# Patient Record
Sex: FEMALE | NOT HISPANIC OR LATINO
[De-identification: names, ages, dates, MRNs, and addresses within clinical notes are randomized per-mention and may not be internally consistent; named-entity substitution may affect disease eponyms.]

---

## 2019-11-27 ENCOUNTER — APPOINTMENT (OUTPATIENT)
Dept: INTERNAL MEDICINE | Facility: CLINIC | Age: 39
End: 2019-11-27
Payer: COMMERCIAL

## 2019-11-27 ENCOUNTER — NON-APPOINTMENT (OUTPATIENT)
Age: 39
End: 2019-11-27

## 2019-11-27 VITALS
WEIGHT: 119 LBS | HEIGHT: 62.5 IN | SYSTOLIC BLOOD PRESSURE: 112 MMHG | DIASTOLIC BLOOD PRESSURE: 60 MMHG | BODY MASS INDEX: 21.35 KG/M2

## 2019-11-27 DIAGNOSIS — Z82.49 FAMILY HISTORY OF ISCHEMIC HEART DISEASE AND OTHER DISEASES OF THE CIRCULATORY SYSTEM: ICD-10-CM

## 2019-11-27 DIAGNOSIS — Z78.9 OTHER SPECIFIED HEALTH STATUS: ICD-10-CM

## 2019-11-27 DIAGNOSIS — Z87.2 PERSONAL HISTORY OF DISEASES OF THE SKIN AND SUBCUTANEOUS TISSUE: ICD-10-CM

## 2019-11-27 DIAGNOSIS — Z23 ENCOUNTER FOR IMMUNIZATION: ICD-10-CM

## 2019-11-27 DIAGNOSIS — Z86.79 PERSONAL HISTORY OF OTHER DISEASES OF THE CIRCULATORY SYSTEM: ICD-10-CM

## 2019-11-27 DIAGNOSIS — Z83.438 FAMILY HISTORY OF OTHER DISORDER OF LIPOPROTEIN METABOLISM AND OTHER LIPIDEMIA: ICD-10-CM

## 2019-11-27 PROCEDURE — G0008: CPT

## 2019-11-27 PROCEDURE — 90686 IIV4 VACC NO PRSV 0.5 ML IM: CPT

## 2019-11-27 PROCEDURE — 99386 PREV VISIT NEW AGE 40-64: CPT | Mod: 25

## 2019-11-27 PROCEDURE — 36415 COLL VENOUS BLD VENIPUNCTURE: CPT

## 2019-11-27 PROCEDURE — 93000 ELECTROCARDIOGRAM COMPLETE: CPT

## 2019-11-27 NOTE — HISTORY OF PRESENT ILLNESS
[FreeTextEntry1] : Initial visit\par \par Annual exam [de-identified] : 39-year-old female, mother of 2, works full time presents for annual exam. Feels well, exercises fairly regularly, denies chest pain shortness of breath palpitations dizziness.\par \par Up to date with screenings

## 2019-11-27 NOTE — HEALTH RISK ASSESSMENT
[Good] : ~his/her~  mood as  good [Yes] : Yes [Monthly or less (1 pt)] : Monthly or less (1 point) [1 or 2 (0 pts)] : 1 or 2 (0 points) [Never (0 pts)] : Never (0 points) [No falls in past year] : Patient reported no falls in the past year [0] : 2) Feeling down, depressed, or hopeless: Not at all (0) [Patient reported mammogram was normal] : Patient reported mammogram was normal [Patient reported PAP Smear was normal] : Patient reported PAP Smear was normal [HIV test declined] : HIV test declined [Hepatitis C test declined] : Hepatitis C test declined [] : No [[id number]] : 0 [MammogramDate] : 01/19 [PapSmearDate] : 01/19

## 2019-11-27 NOTE — PLAN
[FreeTextEntry1] : A 39-year-old female presents for annual exam, feels well. Denies chest pain shortness of breath palpitations dizziness. Up-to-date with screenings\par \par Encouraged to be more consistent with exercise.\par \par Follows up with gastroenterologist for pancreatic cyst.\par \par Call one week for lab results

## 2019-11-27 NOTE — PHYSICAL EXAM
[Normal Female:] : bladder was normal on palpation [Normal] : normal gait, coordination grossly intact, no focal deficits [de-identified] : Deferred GYN; Denies pain, lumps and discharge [FreeTextEntry1] : Deferred GI/GYN [de-identified] : No lymphadenopathy [de-identified] : Denies excessive thirst, urination, fatigue [de-identified] : No rash or skin lesion [de-identified] : Alert and Oriented x3.  Appropriate mood and affect

## 2019-11-28 LAB
ALBUMIN SERPL ELPH-MCNC: 4.9 G/DL
ALP BLD-CCNC: 56 U/L
ALT SERPL-CCNC: 16 U/L
ANION GAP SERPL CALC-SCNC: 14 MMOL/L
APPEARANCE: CLEAR
AST SERPL-CCNC: 18 U/L
BASOPHILS # BLD AUTO: 0.04 K/UL
BASOPHILS NFR BLD AUTO: 0.8 %
BILIRUB SERPL-MCNC: 0.3 MG/DL
BILIRUBIN URINE: NEGATIVE
BLOOD URINE: NEGATIVE
BUN SERPL-MCNC: 17 MG/DL
CALCIUM SERPL-MCNC: 9.9 MG/DL
CHLORIDE SERPL-SCNC: 103 MMOL/L
CHOLEST SERPL-MCNC: 216 MG/DL
CHOLEST/HDLC SERPL: 2.7 RATIO
CO2 SERPL-SCNC: 25 MMOL/L
COLOR: YELLOW
CREAT SERPL-MCNC: 0.74 MG/DL
EOSINOPHIL # BLD AUTO: 0.06 K/UL
EOSINOPHIL NFR BLD AUTO: 1.2 %
FOLATE SERPL-MCNC: 17 NG/ML
GLUCOSE QUALITATIVE U: NEGATIVE
GLUCOSE SERPL-MCNC: 80 MG/DL
HCT VFR BLD CALC: 41.4 %
HDLC SERPL-MCNC: 79 MG/DL
HGB BLD-MCNC: 12.8 G/DL
IMM GRANULOCYTES NFR BLD AUTO: 0.2 %
KETONES URINE: NEGATIVE
LDLC SERPL CALC-MCNC: 124 MG/DL
LEUKOCYTE ESTERASE URINE: NEGATIVE
LYMPHOCYTES # BLD AUTO: 2.07 K/UL
LYMPHOCYTES NFR BLD AUTO: 41.7 %
MAN DIFF?: NORMAL
MCHC RBC-ENTMCNC: 28.1 PG
MCHC RBC-ENTMCNC: 30.9 GM/DL
MCV RBC AUTO: 91 FL
MONOCYTES # BLD AUTO: 0.51 K/UL
MONOCYTES NFR BLD AUTO: 10.3 %
NEUTROPHILS # BLD AUTO: 2.27 K/UL
NEUTROPHILS NFR BLD AUTO: 45.8 %
NITRITE URINE: NEGATIVE
PH URINE: 7.5
PLATELET # BLD AUTO: 301 K/UL
POTASSIUM SERPL-SCNC: 4.8 MMOL/L
PROT SERPL-MCNC: 7.8 G/DL
PROTEIN URINE: NORMAL
RBC # BLD: 4.55 M/UL
RBC # FLD: 13.6 %
SODIUM SERPL-SCNC: 142 MMOL/L
SPECIFIC GRAVITY URINE: 1.02
T4 FREE SERPL-MCNC: 1.3 NG/DL
TRIGL SERPL-MCNC: 67 MG/DL
TSH SERPL-ACNC: 2.3 UIU/ML
UROBILINOGEN URINE: NORMAL
VIT B12 SERPL-MCNC: 601 PG/ML
WBC # FLD AUTO: 4.96 K/UL

## 2019-12-20 ENCOUNTER — APPOINTMENT (OUTPATIENT)
Dept: INTERNAL MEDICINE | Facility: CLINIC | Age: 39
End: 2019-12-20
Payer: COMMERCIAL

## 2019-12-20 VITALS
SYSTOLIC BLOOD PRESSURE: 120 MMHG | HEIGHT: 62.5 IN | BODY MASS INDEX: 20.99 KG/M2 | WEIGHT: 117 LBS | DIASTOLIC BLOOD PRESSURE: 70 MMHG

## 2019-12-20 VITALS — TEMPERATURE: 98.1 F

## 2019-12-20 DIAGNOSIS — R14.0 ABDOMINAL DISTENSION (GASEOUS): ICD-10-CM

## 2019-12-20 PROCEDURE — 99213 OFFICE O/P EST LOW 20 MIN: CPT

## 2019-12-20 RX ORDER — VALACYCLOVIR 1 G/1
1 TABLET, FILM COATED ORAL
Qty: 30 | Refills: 0 | Status: DISCONTINUED | COMMUNITY
Start: 2019-12-04 | End: 2019-12-20

## 2019-12-20 NOTE — PLAN
[FreeTextEntry1] : 39-year-old female, very anxious about travel, presents with complaint of on and off right upper quadrant abdominal pain for about a week, for the past couple of days occurring after lunch but not any other meal. Abdomen is soft nontender with hyperactive bowel sounds. She currently takes Prilosec daily advised bland diet Gas-X and if symptoms increase to go to the emergency room

## 2019-12-20 NOTE — REVIEW OF SYSTEMS
[Abdominal Pain] : abdominal pain [Negative] : Heme/Lymph [FreeTextEntry7] : right upper quadrant abdominal pain as noted history of present illness

## 2019-12-20 NOTE — HISTORY OF PRESENT ILLNESS
[FreeTextEntry8] : A 39-year-old female presents with complaint of right upper quadrant abdominal pain on an off for the past week. For the past 2 days it seems to be more after eating. Takes Prilosec daily for reflux. Diet has not been the same due to the holidays. Denies fevers chills, constipation/diarrhea.\par \par Leaving for [place] 12/26 and very worried about being sick there.

## 2019-12-20 NOTE — PHYSICAL EXAM
[No Acute Distress] : no acute distress [Well Nourished] : well nourished [Well Developed] : well developed [Well-Appearing] : well-appearing [Normal Sclera/Conjunctiva] : normal sclera/conjunctiva [PERRL] : pupils equal round and reactive to light [EOMI] : extraocular movements intact [Normal Outer Ear/Nose] : the outer ears and nose were normal in appearance [Normal Oropharynx] : the oropharynx was normal [No JVD] : no jugular venous distention [No Lymphadenopathy] : no lymphadenopathy [Supple] : supple [Thyroid Normal, No Nodules] : the thyroid was normal and there were no nodules present [No Respiratory Distress] : no respiratory distress  [No Accessory Muscle Use] : no accessory muscle use [Clear to Auscultation] : lungs were clear to auscultation bilaterally [Normal Rate] : normal rate  [Regular Rhythm] : with a regular rhythm [Normal S1, S2] : normal S1 and S2 [No Murmur] : no murmur heard [No Carotid Bruits] : no carotid bruits [No Abdominal Bruit] : a ~M bruit was not heard ~T in the abdomen [No Varicosities] : no varicosities [Pedal Pulses Present] : the pedal pulses are present [No Edema] : there was no peripheral edema [No Palpable Aorta] : no palpable aorta [No Extremity Clubbing/Cyanosis] : no extremity clubbing/cyanosis [Soft] : abdomen soft [Non Tender] : non-tender [Non-distended] : non-distended [No Masses] : no abdominal mass palpated [No HSM] : no HSM [Normal Posterior Cervical Nodes] : no posterior cervical lymphadenopathy [Normal Anterior Cervical Nodes] : no anterior cervical lymphadenopathy [No CVA Tenderness] : no CVA  tenderness [No Spinal Tenderness] : no spinal tenderness [No Joint Swelling] : no joint swelling [Grossly Normal Strength/Tone] : grossly normal strength/tone [No Rash] : no rash [Coordination Grossly Intact] : coordination grossly intact [No Focal Deficits] : no focal deficits [Normal Gait] : normal gait [Deep Tendon Reflexes (DTR)] : deep tendon reflexes were 2+ and symmetric [Normal Affect] : the affect was normal [Normal Insight/Judgement] : insight and judgment were intact [de-identified] : abdomen completely nontenderwith hyperactive bowel sounds

## 2021-03-02 ENCOUNTER — APPOINTMENT (OUTPATIENT)
Dept: FAMILY MEDICINE | Facility: CLINIC | Age: 41
End: 2021-03-02

## 2021-03-19 ENCOUNTER — APPOINTMENT (OUTPATIENT)
Dept: INTERNAL MEDICINE | Facility: CLINIC | Age: 41
End: 2021-03-19
Payer: COMMERCIAL

## 2021-03-19 ENCOUNTER — LABORATORY RESULT (OUTPATIENT)
Age: 41
End: 2021-03-19

## 2021-03-19 ENCOUNTER — NON-APPOINTMENT (OUTPATIENT)
Age: 41
End: 2021-03-19

## 2021-03-19 VITALS
WEIGHT: 121 LBS | DIASTOLIC BLOOD PRESSURE: 74 MMHG | BODY MASS INDEX: 21.71 KG/M2 | HEIGHT: 62.5 IN | SYSTOLIC BLOOD PRESSURE: 116 MMHG

## 2021-03-19 DIAGNOSIS — B00.9 HERPESVIRAL INFECTION, UNSPECIFIED: ICD-10-CM

## 2021-03-19 DIAGNOSIS — Z20.822 CONTACT WITH AND (SUSPECTED) EXPOSURE TO COVID-19: ICD-10-CM

## 2021-03-19 PROCEDURE — 99396 PREV VISIT EST AGE 40-64: CPT | Mod: 25

## 2021-03-19 PROCEDURE — 36415 COLL VENOUS BLD VENIPUNCTURE: CPT

## 2021-03-19 PROCEDURE — 99072 ADDL SUPL MATRL&STAF TM PHE: CPT

## 2021-03-19 NOTE — PLAN
[FreeTextEntry1] : 40-year-old female as noted presents for annual exam complaining of recurrent herpes infection. Discussed at length, and given prescription for Valtrex and also advised to use Aleve for up to 3 days in arousal to help with discomfort. Patient knows to take it with food\par \par Reviewed diet and stressed the importance of continuing regular aerobic exercise\par \par Call next week to review labs

## 2021-03-19 NOTE — HISTORY OF PRESENT ILLNESS
[FreeTextEntry1] : Annual exam [de-identified] : 40-year-old female 2 sons 7 and 10, she and her  are working from home presents for annual exam. Only complaint is that approximately 2 months ago she developed pain on the right side of her face around her cheek below her eyes and developed a rash went to urgent care 2 different times first time diagnosed with staph and given an antibiotic which didn't help second time diagnosed with shingles and given Valtrex. She saw ophthalmology who said her eye was fine but the it was not shingles. The rash has recurred a couple of times since then, she went to see dermatologist Dr. Blanca  who diagnosed her with herpes simplex. Even when she doesn't have the rash he has a headache and pain in the right side of her face, no weakness. She is concerned about this continuing to recur. Right now she has mild pain on the right side of her face mild headache and a very minimal red blotch on her right cheek. Dr. Blanca has her mixing bacitracin and a topical steroid to use to decrease redness and inflammation as well as prevent infection.\par \par No other complaints, exercising regularly without difficulty, up to date with screenings

## 2021-03-19 NOTE — PHYSICAL EXAM
[Normal Female:] : bladder was normal on palpation [Normal] : normal gait, coordination grossly intact, no focal deficits [de-identified] : Deferred GYN; Denies pain, lumps and discharge [FreeTextEntry1] : Deferred GI/GYN [de-identified] : No lymphadenopathy [de-identified] : Denies excessive thirst, urination, fatigue [de-identified] : Slightly erythmatic dime-sized macular lesion right upper cheek

## 2021-03-19 NOTE — HEALTH RISK ASSESSMENT
[Very Good] : ~his/her~  mood as very good [Yes] : Yes [Monthly or less (1 pt)] : Monthly or less (1 point) [1 or 2 (0 pts)] : 1 or 2 (0 points) [Never (0 pts)] : Never (0 points) [No falls in past year] : Patient reported no falls in the past year [0] : 2) Feeling down, depressed, or hopeless: Not at all (0) [Patient reported mammogram was normal] : Patient reported mammogram was normal [Patient reported PAP Smear was normal] : Patient reported PAP Smear was normal [Patient declined bone density test] : Patient declined bone density test [Patient declined colonoscopy] : Patient declined colonoscopy [HIV test declined] : HIV test declined [Hepatitis C test declined] : Hepatitis C test declined [] : No [[id number]] : 0 [MammogramDate] : 01/20 [PapSmearDate] : 03/20

## 2021-03-20 LAB
ALBUMIN SERPL ELPH-MCNC: 5.1 G/DL
ALP BLD-CCNC: 72 U/L
ALT SERPL-CCNC: 14 U/L
ANION GAP SERPL CALC-SCNC: 16 MMOL/L
APPEARANCE: CLEAR
AST SERPL-CCNC: 18 U/L
BASOPHILS # BLD AUTO: 0.05 K/UL
BASOPHILS NFR BLD AUTO: 0.7 %
BILIRUB SERPL-MCNC: 0.4 MG/DL
BILIRUBIN URINE: NEGATIVE
BLOOD URINE: NEGATIVE
BUN SERPL-MCNC: 9 MG/DL
CALCIUM SERPL-MCNC: 9.8 MG/DL
CHLORIDE SERPL-SCNC: 99 MMOL/L
CHOLEST SERPL-MCNC: 244 MG/DL
CO2 SERPL-SCNC: 23 MMOL/L
COLOR: COLORLESS
COVID-19 NUCLEOCAPSID  GAM ANTIBODY INTERPRETATION: NEGATIVE
CREAT SERPL-MCNC: 0.62 MG/DL
EOSINOPHIL # BLD AUTO: 0.03 K/UL
EOSINOPHIL NFR BLD AUTO: 0.4 %
FOLATE SERPL-MCNC: 15 NG/ML
GLUCOSE QUALITATIVE U: NEGATIVE
GLUCOSE SERPL-MCNC: 97 MG/DL
HCT VFR BLD CALC: 41 %
HDLC SERPL-MCNC: 80 MG/DL
HGB BLD-MCNC: 13.3 G/DL
IMM GRANULOCYTES NFR BLD AUTO: 0.4 %
KETONES URINE: NEGATIVE
LDLC SERPL CALC-MCNC: 152 MG/DL
LEUKOCYTE ESTERASE URINE: NEGATIVE
LYMPHOCYTES # BLD AUTO: 2.53 K/UL
LYMPHOCYTES NFR BLD AUTO: 35.2 %
MAN DIFF?: NORMAL
MCHC RBC-ENTMCNC: 29.3 PG
MCHC RBC-ENTMCNC: 32.4 GM/DL
MCV RBC AUTO: 90.3 FL
MONOCYTES # BLD AUTO: 0.58 K/UL
MONOCYTES NFR BLD AUTO: 8.1 %
NEUTROPHILS # BLD AUTO: 3.97 K/UL
NEUTROPHILS NFR BLD AUTO: 55.2 %
NITRITE URINE: NEGATIVE
NONHDLC SERPL-MCNC: 164 MG/DL
PH URINE: 6
PLATELET # BLD AUTO: 332 K/UL
POTASSIUM SERPL-SCNC: 3.8 MMOL/L
PROT SERPL-MCNC: 8.3 G/DL
PROTEIN URINE: NEGATIVE
RBC # BLD: 4.54 M/UL
RBC # FLD: 14.5 %
SARS-COV-2 AB SERPL QL IA: 0.07 INDEX
SODIUM SERPL-SCNC: 138 MMOL/L
SPECIFIC GRAVITY URINE: 1
T4 FREE SERPL-MCNC: 1.5 NG/DL
TRIGL SERPL-MCNC: 60 MG/DL
TSH SERPL-ACNC: 2.27 UIU/ML
UROBILINOGEN URINE: NORMAL
VIT B12 SERPL-MCNC: 705 PG/ML
WBC # FLD AUTO: 7.19 K/UL

## 2021-04-26 ENCOUNTER — APPOINTMENT (OUTPATIENT)
Dept: INTERNAL MEDICINE | Facility: CLINIC | Age: 41
End: 2021-04-26
Payer: COMMERCIAL

## 2021-04-26 ENCOUNTER — LABORATORY RESULT (OUTPATIENT)
Age: 41
End: 2021-04-26

## 2021-04-26 VITALS
DIASTOLIC BLOOD PRESSURE: 70 MMHG | BODY MASS INDEX: 20.99 KG/M2 | WEIGHT: 117 LBS | TEMPERATURE: 99.1 F | SYSTOLIC BLOOD PRESSURE: 104 MMHG | HEIGHT: 62.5 IN

## 2021-04-26 DIAGNOSIS — M62.838 OTHER MUSCLE SPASM: ICD-10-CM

## 2021-04-26 PROCEDURE — 36415 COLL VENOUS BLD VENIPUNCTURE: CPT

## 2021-04-26 PROCEDURE — 99072 ADDL SUPL MATRL&STAF TM PHE: CPT

## 2021-04-26 PROCEDURE — 99213 OFFICE O/P EST LOW 20 MIN: CPT | Mod: 25

## 2021-04-26 NOTE — HISTORY OF PRESENT ILLNESS
[FreeTextEntry8] : 40-year-old female presents complaining of headache, mostly right-sided, for the past 4 days. It started about an hour before getting her second covid vaccine and has been on and off since. Denies change in vision, weakness. States she has some slight nausea occasionally but no vomiting. She doesn't usually get headaches and she's never been diagnosed with migraine. She's also complaining about neck pain which she's had in the past but it's been worse the last few days, she occasionally has tingling sensation/pain radiating down right arm which does not last for very long, she denies any weakness.\par She's been working from home and it's been very stressful, a lot of pressure at work, difficulty with childcare, she feels her anxiety is much worse than usual lately.

## 2021-04-26 NOTE — PLAN
[FreeTextEntry1] : 40-year-old female is noted presents complaining of a headache neck pain anxiety. Also concerned she may have Lyme, Lyme titer done. Her symptoms otherwise are more consistent with anxiety and tension headache. Advised Excedrin 2 tabs q.6 h. p.r.n., stay hydrated, given prescription for physical therapy for her neck, advised moist heat, also strongly advised good ergonomics when working from home. If symptoms increase or persist return to office otherwise: 2 days for lab results and followup

## 2021-04-26 NOTE — PHYSICAL EXAM
[No Acute Distress] : no acute distress [Well Nourished] : well nourished [Well Developed] : well developed [Well-Appearing] : well-appearing [Normal Sclera/Conjunctiva] : normal sclera/conjunctiva [PERRL] : pupils equal round and reactive to light [EOMI] : extraocular movements intact [Normal Outer Ear/Nose] : the outer ears and nose were normal in appearance [Normal Oropharynx] : the oropharynx was normal [No JVD] : no jugular venous distention [No Lymphadenopathy] : no lymphadenopathy [Supple] : supple [Thyroid Normal, No Nodules] : the thyroid was normal and there were no nodules present [No Respiratory Distress] : no respiratory distress  [No Accessory Muscle Use] : no accessory muscle use [Clear to Auscultation] : lungs were clear to auscultation bilaterally [Normal Rate] : normal rate  [Regular Rhythm] : with a regular rhythm [Normal S1, S2] : normal S1 and S2 [No Murmur] : no murmur heard [No Carotid Bruits] : no carotid bruits [No Abdominal Bruit] : a ~M bruit was not heard ~T in the abdomen [No Varicosities] : no varicosities [Pedal Pulses Present] : the pedal pulses are present [No Edema] : there was no peripheral edema [No Palpable Aorta] : no palpable aorta [No Extremity Clubbing/Cyanosis] : no extremity clubbing/cyanosis [Soft] : abdomen soft [Non Tender] : non-tender [Non-distended] : non-distended [No Masses] : no abdominal mass palpated [No HSM] : no HSM [Normal Bowel Sounds] : normal bowel sounds [Normal Posterior Cervical Nodes] : no posterior cervical lymphadenopathy [Normal Anterior Cervical Nodes] : no anterior cervical lymphadenopathy [No CVA Tenderness] : no CVA  tenderness [No Spinal Tenderness] : no spinal tenderness [No Joint Swelling] : no joint swelling [Grossly Normal Strength/Tone] : grossly normal strength/tone [No Rash] : no rash [Coordination Grossly Intact] : coordination grossly intact [No Focal Deficits] : no focal deficits [Normal Gait] : normal gait [Deep Tendon Reflexes (DTR)] : deep tendon reflexes were 2+ and symmetric [Speech Grossly Normal] : speech grossly normal [Alert and Oriented x3] : oriented to person, place, and time [Normal Insight/Judgement] : insight and judgment were intact [de-identified] : anxious

## 2021-04-26 NOTE — REVIEW OF SYSTEMS
[Headache] : headache [Dizziness] : no dizziness [Negative] : Heme/Lymph [FreeTextEntry4] : neck pain [de-identified] : mostly right-sided and frontal

## 2021-04-27 ENCOUNTER — APPOINTMENT (OUTPATIENT)
Dept: OBGYN | Facility: CLINIC | Age: 41
End: 2021-04-27
Payer: COMMERCIAL

## 2021-04-27 VITALS
SYSTOLIC BLOOD PRESSURE: 126 MMHG | HEIGHT: 62.5 IN | DIASTOLIC BLOOD PRESSURE: 70 MMHG | WEIGHT: 117 LBS | BODY MASS INDEX: 20.99 KG/M2

## 2021-04-27 DIAGNOSIS — R92.2 INCONCLUSIVE MAMMOGRAM: ICD-10-CM

## 2021-04-27 DIAGNOSIS — Z12.31 ENCOUNTER FOR SCREENING MAMMOGRAM FOR MALIGNANT NEOPLASM OF BREAST: ICD-10-CM

## 2021-04-27 LAB — B BURGDOR IGG+IGM SER QL IB: NORMAL

## 2021-04-27 PROCEDURE — 99386 PREV VISIT NEW AGE 40-64: CPT

## 2021-04-27 PROCEDURE — 99072 ADDL SUPL MATRL&STAF TM PHE: CPT

## 2021-04-27 NOTE — HISTORY OF PRESENT ILLNESS
[TextBox_4] : 40 year old  for annual exam. No complaints today. Regular monthly periods; her period just started yesterday. Declines Pap today, wishes to come back when she isn't menstruating. No history of cervical dysplasia. Her  is s/p vasectomy. Due for mammogram; has history of fibroadenoma, PASH, and benign cysts that have all been biopsied in the past. Works as a  for an [employer] agency. Her kids are 7 and 10 years old.

## 2021-05-05 ENCOUNTER — TRANSCRIPTION ENCOUNTER (OUTPATIENT)
Age: 41
End: 2021-05-05

## 2021-05-17 ENCOUNTER — RESULT REVIEW (OUTPATIENT)
Age: 41
End: 2021-05-17

## 2021-05-20 ENCOUNTER — APPOINTMENT (OUTPATIENT)
Dept: NEUROLOGY | Facility: CLINIC | Age: 41
End: 2021-05-20

## 2021-09-13 RX ORDER — VALACYCLOVIR 1 G/1
1 TABLET, FILM COATED ORAL
Qty: 60 | Refills: 0 | Status: ACTIVE | COMMUNITY
Start: 1900-01-01 | End: 1900-01-01

## 2021-10-26 ENCOUNTER — APPOINTMENT (OUTPATIENT)
Dept: INTERNAL MEDICINE | Facility: CLINIC | Age: 41
End: 2021-10-26
Payer: COMMERCIAL

## 2021-10-26 DIAGNOSIS — Z00.00 ENCOUNTER FOR GENERAL ADULT MEDICAL EXAMINATION W/OUT ABNORMAL FINDINGS: ICD-10-CM

## 2021-10-26 PROCEDURE — 36415 COLL VENOUS BLD VENIPUNCTURE: CPT

## 2021-10-27 LAB
CHOLEST SERPL-MCNC: 262 MG/DL
HDLC SERPL-MCNC: 68 MG/DL
LDLC SERPL CALC-MCNC: 184 MG/DL
NONHDLC SERPL-MCNC: 194 MG/DL
TRIGL SERPL-MCNC: 52 MG/DL

## 2021-10-28 ENCOUNTER — APPOINTMENT (OUTPATIENT)
Dept: INTERNAL MEDICINE | Facility: CLINIC | Age: 41
End: 2021-10-28

## 2021-11-04 ENCOUNTER — APPOINTMENT (OUTPATIENT)
Dept: INTERNAL MEDICINE | Facility: CLINIC | Age: 41
End: 2021-11-04
Payer: COMMERCIAL

## 2021-11-04 VITALS
WEIGHT: 104 LBS | HEIGHT: 62.5 IN | DIASTOLIC BLOOD PRESSURE: 60 MMHG | BODY MASS INDEX: 18.66 KG/M2 | SYSTOLIC BLOOD PRESSURE: 120 MMHG

## 2021-11-04 DIAGNOSIS — E78.5 HYPERLIPIDEMIA, UNSPECIFIED: ICD-10-CM

## 2021-11-04 DIAGNOSIS — F41.8 OTHER SPECIFIED ANXIETY DISORDERS: ICD-10-CM

## 2021-11-04 PROCEDURE — 36415 COLL VENOUS BLD VENIPUNCTURE: CPT

## 2021-11-04 PROCEDURE — 99213 OFFICE O/P EST LOW 20 MIN: CPT | Mod: 25

## 2021-11-04 RX ORDER — VITAMIN E (DL,TOCOPHERYL ACET) 180 MG
CAPSULE ORAL
Refills: 0 | Status: DISCONTINUED | COMMUNITY
End: 2021-11-04

## 2021-11-04 RX ORDER — VALACYCLOVIR 1 G/1
1 TABLET, FILM COATED ORAL
Qty: 60 | Refills: 2 | Status: DISCONTINUED | COMMUNITY
Start: 2021-03-19 | End: 2021-11-04

## 2021-11-04 RX ORDER — OMEPRAZOLE MAGNESIUM 20 MG/1
20 TABLET, DELAYED RELEASE ORAL
Refills: 0 | Status: DISCONTINUED | COMMUNITY
End: 2021-11-04

## 2021-11-04 NOTE — HISTORY OF PRESENT ILLNESS
[FreeTextEntry1] : Followup hyperlipidemia [de-identified] : 41-year-old very anxious female who has been working on diet and exercise for at least 6 months, she has lost weight, borderline too much weight, in order to control her cholesterol. She is very disappointed that her LDL has gone up from 6 months ago.Both her parents have high cholesterol but no early heart disease, both parents are in mid 70s

## 2021-11-04 NOTE — PLAN
[FreeTextEntry1] : 41-year-old female, very anxious, upset about hyperlipidemia. Discussed her diet, lifestyle in detail. Referred to Dilma Quarles nutritionist, repeat lipid panel today and sent to [facility]. Followup 2-3 days

## 2021-11-17 ENCOUNTER — APPOINTMENT (OUTPATIENT)
Dept: GASTROENTEROLOGY | Facility: CLINIC | Age: 41
End: 2021-11-17
Payer: COMMERCIAL

## 2021-11-17 VITALS
SYSTOLIC BLOOD PRESSURE: 126 MMHG | WEIGHT: 105 LBS | DIASTOLIC BLOOD PRESSURE: 78 MMHG | BODY MASS INDEX: 18.84 KG/M2 | HEIGHT: 62.5 IN

## 2021-11-17 DIAGNOSIS — K29.70 GASTRITIS, UNSPECIFIED, W/OUT BLEEDING: ICD-10-CM

## 2021-11-17 PROCEDURE — 99204 OFFICE O/P NEW MOD 45 MIN: CPT

## 2021-11-17 RX ORDER — CYCLOBENZAPRINE HYDROCHLORIDE 10 MG/1
10 TABLET, FILM COATED ORAL
Qty: 20 | Refills: 0 | Status: DISCONTINUED | COMMUNITY
Start: 2021-04-26 | End: 2021-11-17

## 2021-11-18 NOTE — ASSESSMENT
[FreeTextEntry1] : Pancreatic cyst- due for surveillance MRI\par -patient will bring prior imaging on CD\par -MRI ordered\par \par Gastritis- likely recent exacerbation due to NSAIDS\par -resume PPI. \par -obtained prior records\par -taper off of PPI in 2-3 months\par -resume PPI if requiring regular NSAIDS\par \par \par Right Pelvic pain- likely MSK strain due to recent increase in exercise. \par -gyn eval scheduled\par -in setting of recent weight loss, also recommend colonoscopy, \par Risks (including but not limited to sedation risks, infection, bleeding, perforation, possibility of missed lesions), benefits and alternatives to procedure, including not doing the procedure, were discussed with patient. Patient understood and agreed to proceed with colonoscopy. \par Colonoscopy preparation instructions reviewed with patient.\par 2 Dulcolax two days prior to procedure + Split MiraLAX/Dulcolax preparation starting day prior to procedure\par \par Weight loss- somewhat intentional , although patient now reports she is below her baseline weight. \par -recommend colonoscopy.\par \par anal lesion- refer to CRS, r/o AIN\par \par

## 2021-11-18 NOTE — HISTORY OF PRESENT ILLNESS
[FreeTextEntry1] : 41 year old F PMH anxiety, hld, h/o pancreatic cyst , h/o gastritis, h/o anal fissure, presents for evaluation of multiple GI complaints. \par \par previously followed with Dr. Bustamante and Dr. Cullen\par \par She has acid reflux/ gastritis, with epigastric pain 30 min after eating. she has been off/on ppi x 4 years. She has tried to stop many times, longest she can stay off of it is 4 months. \par tried to stop ~ 1 month ago\par then has flare of her symptoms 1 month later then has to take it twice a day  and tapers to 20 mg daily. \par she restarted 2 days ago. \par \par she has had 3 prior EGD, last one 3 years ago at [facility]. she has small HH. \par \par RLQ pain off/on for the past decade. she has had prior imaging. it was gone for 3 years. Recently returned.  1 week ago felt like right lower abdomen was raised, then had pain there. She was seen at urgent care, no labs or imaging performed. \par last abd imaging was 2 years ago. \par \par she is seeing gyn in 2 weeks. \par \par No prior colonoscopy\par \par 1 month ago she felt a "tickle" in her rectum. felt small hard white bump in her anus.\par \par since 04/2021, she lost 20 lbs, through diet and exercise. She had  gained weight previously. She now feels she is underweight. \par she feels that 109-110 lbs is her normal weight\par she is now maintaining her weight at ~ 105 lbs\par \par cardio 5d per week, strength 5-6 d per week . \par  \par Advil - few times per week , 3 per day or 2 Aleve. \par \par Patient denies dysphagia/odynophagia, change in bowel habits, diarrhea, constipation, brbpr, melena. Good appetite. Patient has daily BM. \par \par fam hx- negative for cancer

## 2021-11-18 NOTE — REASON FOR VISIT
[Consultation] : a consultation visit [FreeTextEntry1] : Patient seen at the request of LUZMA Gonzalez for the evaluation of GI complaints

## 2021-11-18 NOTE — PHYSICAL EXAM
[General Appearance - Alert] : alert [General Appearance - In No Acute Distress] : in no acute distress [Sclera] : the sclera and conjunctiva were normal [Outer Ear] : the ears and nose were normal in appearance [Neck Appearance] : the appearance of the neck was normal [] : no respiratory distress [Apical Impulse] : the apical impulse was normal [Abdomen Soft] : soft [Abdomen Tenderness] : non-tender [Abnormal Walk] : normal gait [Skin Color & Pigmentation] : normal skin color and pigmentation [Oriented To Time, Place, And Person] : oriented to person, place, and time [FreeTextEntry1] : healed posterior anal fissure, small white punctate anal lesions, nontender. normal LACI, no stool in vault. Chaperoned by Trisha Aragon MA

## 2021-11-23 ENCOUNTER — APPOINTMENT (OUTPATIENT)
Dept: SURGERY | Facility: CLINIC | Age: 41
End: 2021-11-23
Payer: COMMERCIAL

## 2021-11-23 VITALS
HEART RATE: 100 BPM | OXYGEN SATURATION: 100 % | HEIGHT: 62.5 IN | WEIGHT: 105 LBS | BODY MASS INDEX: 18.84 KG/M2 | RESPIRATION RATE: 19 BRPM | DIASTOLIC BLOOD PRESSURE: 82 MMHG | SYSTOLIC BLOOD PRESSURE: 138 MMHG

## 2021-11-23 DIAGNOSIS — R19.8 OTHER SPECIFIED SYMPTOMS AND SIGNS INVOLVING THE DIGESTIVE SYSTEM AND ABDOMEN: ICD-10-CM

## 2021-11-23 PROCEDURE — 46600 DIAGNOSTIC ANOSCOPY SPX: CPT

## 2021-11-23 PROCEDURE — 99202 OFFICE O/P NEW SF 15 MIN: CPT | Mod: 25

## 2021-12-02 ENCOUNTER — APPOINTMENT (OUTPATIENT)
Dept: OBGYN | Facility: CLINIC | Age: 41
End: 2021-12-02
Payer: COMMERCIAL

## 2021-12-02 VITALS
BODY MASS INDEX: 18.84 KG/M2 | SYSTOLIC BLOOD PRESSURE: 130 MMHG | HEIGHT: 62.5 IN | DIASTOLIC BLOOD PRESSURE: 70 MMHG | WEIGHT: 105 LBS

## 2021-12-02 DIAGNOSIS — R10.31 RIGHT LOWER QUADRANT PAIN: ICD-10-CM

## 2021-12-02 DIAGNOSIS — G89.29 RIGHT LOWER QUADRANT PAIN: ICD-10-CM

## 2021-12-02 DIAGNOSIS — Z12.4 ENCOUNTER FOR SCREENING FOR MALIGNANT NEOPLASM OF CERVIX: ICD-10-CM

## 2021-12-02 PROCEDURE — 99215 OFFICE O/P EST HI 40 MIN: CPT

## 2021-12-06 LAB
CYTOLOGY CVX/VAG DOC THIN PREP: NORMAL
HPV HIGH+LOW RISK DNA PNL CVX: NOT DETECTED

## 2021-12-07 ENCOUNTER — RESULT REVIEW (OUTPATIENT)
Age: 41
End: 2021-12-07

## 2021-12-08 ENCOUNTER — RESULT REVIEW (OUTPATIENT)
Age: 41
End: 2021-12-08

## 2021-12-13 ENCOUNTER — APPOINTMENT (OUTPATIENT)
Dept: GASTROENTEROLOGY | Facility: CLINIC | Age: 41
End: 2021-12-13
Payer: COMMERCIAL

## 2021-12-13 VITALS
DIASTOLIC BLOOD PRESSURE: 80 MMHG | SYSTOLIC BLOOD PRESSURE: 120 MMHG | HEIGHT: 62.5 IN | WEIGHT: 103 LBS | HEART RATE: 104 BPM | OXYGEN SATURATION: 99 % | BODY MASS INDEX: 18.48 KG/M2 | TEMPERATURE: 96.6 F

## 2021-12-13 DIAGNOSIS — R10.13 EPIGASTRIC PAIN: ICD-10-CM

## 2021-12-13 DIAGNOSIS — N83.292 OTHER OVARIAN CYST, LEFT SIDE: ICD-10-CM

## 2021-12-13 DIAGNOSIS — R10.2 PELVIC AND PERINEAL PAIN: ICD-10-CM

## 2021-12-13 DIAGNOSIS — K86.2 CYST OF PANCREAS: ICD-10-CM

## 2021-12-13 PROCEDURE — 99214 OFFICE O/P EST MOD 30 MIN: CPT

## 2021-12-13 RX ORDER — HUMAN PAPILLOMAVIRUS 9-VALENT VACCINE, RECOMBINANT 30; 40; 60; 40; 20; 20; 20; 20; 20 UG/.5ML; UG/.5ML; UG/.5ML; UG/.5ML; UG/.5ML; UG/.5ML; UG/.5ML; UG/.5ML; UG/.5ML
INJECTION, SUSPENSION INTRAMUSCULAR
Qty: 1 | Refills: 2 | Status: DISCONTINUED | COMMUNITY
Start: 2021-12-02 | End: 2021-12-13

## 2021-12-14 PROBLEM — K86.2 PANCREATIC CYST: Status: ACTIVE | Noted: 2019-11-27

## 2021-12-14 PROBLEM — R10.2 PELVIC PAIN: Status: ACTIVE | Noted: 2021-12-02

## 2021-12-14 LAB
ALBUMIN SERPL ELPH-MCNC: 4.8 G/DL
ALP BLD-CCNC: 66 U/L
ALT SERPL-CCNC: 13 U/L
ANION GAP SERPL CALC-SCNC: 12 MMOL/L
AST SERPL-CCNC: 16 U/L
BASOPHILS # BLD AUTO: 0.04 K/UL
BASOPHILS NFR BLD AUTO: 0.6 %
BILIRUB SERPL-MCNC: 0.2 MG/DL
BUN SERPL-MCNC: 11 MG/DL
CALCIUM SERPL-MCNC: 9.8 MG/DL
CHLORIDE SERPL-SCNC: 102 MMOL/L
CO2 SERPL-SCNC: 26 MMOL/L
CREAT SERPL-MCNC: 0.66 MG/DL
EOSINOPHIL # BLD AUTO: 0.03 K/UL
EOSINOPHIL NFR BLD AUTO: 0.5 %
GLUCOSE SERPL-MCNC: 99 MG/DL
HCT VFR BLD CALC: 40.7 %
HGB BLD-MCNC: 13.1 G/DL
IMM GRANULOCYTES NFR BLD AUTO: 0.5 %
LPL SERPL-CCNC: 35 U/L
LYMPHOCYTES # BLD AUTO: 1.76 K/UL
LYMPHOCYTES NFR BLD AUTO: 27 %
MAN DIFF?: NORMAL
MCHC RBC-ENTMCNC: 29.2 PG
MCHC RBC-ENTMCNC: 32.2 GM/DL
MCV RBC AUTO: 90.8 FL
MONOCYTES # BLD AUTO: 0.49 K/UL
MONOCYTES NFR BLD AUTO: 7.5 %
NEUTROPHILS # BLD AUTO: 4.16 K/UL
NEUTROPHILS NFR BLD AUTO: 63.9 %
PLATELET # BLD AUTO: 335 K/UL
POTASSIUM SERPL-SCNC: 4.6 MMOL/L
PROT SERPL-MCNC: 7.5 G/DL
RBC # BLD: 4.48 M/UL
RBC # FLD: 13.2 %
SODIUM SERPL-SCNC: 140 MMOL/L
WBC # FLD AUTO: 6.51 K/UL

## 2021-12-15 PROBLEM — R19.8: Status: ACTIVE | Noted: 2021-11-17

## 2021-12-15 NOTE — ASSESSMENT
08/24/20 1030   Activity/Group Checklist   Group Personal control   Attendance Attended   Attendance Duration (min) Greater than 60   Interactions Interacted appropriately   Affect/Mood Appropriate   Goals Achieved Identified feelings; Discussed coping strategies; Able to listen to others; Able to engage in interactions; Able to reflect/comment on own behavior;Able to self-disclose; Able to recieve feedback [FreeTextEntry1] : With-year-old female here for initial evaluation for possible perianal nodule.\par \par Unable to identify the findings as previously noted on exam Dr. Oneill reassured patient that she has a normal anorectal exam at this time with some mild hemorrhoids that are asymptomatic at this time\par \par Recommend follow-up if patient has recurrent symptoms or is able to identify this area once again

## 2021-12-15 NOTE — HISTORY OF PRESENT ILLNESS
[FreeTextEntry1] : 41-year-old female referred by Dr. Oneill for a possible hemorrhoid versus some other bump identified recently on exam. Patient felt a small white lump near the anal verge and was concerned that it needed to be treated. According to Dr. Oneill that she simply noted this finding as well. She denies significant rectal bleeding. Denies significant anorectal pain.

## 2021-12-15 NOTE — PHYSICAL EXAM
[Abdomen Masses] : No abdominal masses [Abdomen Tenderness] : ~T No ~M abdominal tenderness [Normal rectal exam] : exam was normal [Excoriation] : no perianal excoriation [Tender, Swollen] : nontender, non-swollen [Normal] : was normal [None] : there was no rectal abscess [Respiratory Effort] : normal respiratory effort [de-identified] : Small external hemorrhoids but no appreciable nodularity that was previously noted on exam [de-identified] : No acute distress

## 2021-12-29 ENCOUNTER — RESULT REVIEW (OUTPATIENT)
Age: 41
End: 2021-12-29

## 2021-12-30 NOTE — ADDENDUM
[FreeTextEntry1] : 12/30/21: reviewed MRI findings with patient . mild enlargement of pancreatic cyst compared to 2018. possible sludge in GB. Patient referred to Dr. Clarke for upper EUS. \par She has weight loss and RUQ pain. \par EGD/Colonoscopy scheduled next week. \par If endoscopy/EUS unrevealing will consider referral to surgeon for cholecystectomy. \par \par

## 2021-12-30 NOTE — ASSESSMENT
[FreeTextEntry1] : Pancreatic cyst- due for surveillance MRI, scheduled end of this month\par -patient to send prior reports to me and will bring prior imaging on CD\par \par Epigastric pain,\par check labs\par no cause identified on recent US. MRI ordered. \par schedule EGD on same day as colonoscopy. \par continue omeprazole 40 mg daily. \par patient instructed to go to ED if worsening pain or other concerns. \par \par \par Right Pelvic pain- likely MSK strain due to recent increase in exercise. now appears improved\par -patient has had gyn eval and surveillance US planned in 6 months. \par -pelvic MRI ordered to obtained when she gets abd mri\par -colonoscopy scheduled\par \par Weight loss- initially somewhat intentional ,\par -MRI Abd/Pelvis \par -EGD/Colonoscopy.\par -ensure 2 cans per day\par \par \par \par

## 2021-12-30 NOTE — HISTORY OF PRESENT ILLNESS
[FreeTextEntry1] : 41 year old F PMH anxiety, hld, h/o pancreatic cyst , h/o gastritis, h/o anal fissure, presents for evaluation of right sided abdominal pain. \par \par 2 weeks ago she started getting pain under right rib , would come and go, last ~ 1 hour. 3 days ago became worse. radiates to back. bruning sensation with ehs wakes up, more sharp pain. \par After last visit she started PPI 40 mg daily x 1 week, then decreased to 20 mg daily, was still having breakthrough acid reflux. \par 3 days ago she increased PPI back to 40 mg. \par She notes pain is worse when she tried to eat more, she has been trying to increase her calorie intake because of her recent weight loss. she has lost 2 lbs in the past month. She is still exercising, but less intense now. \par Pain is a/w nausea, no vomiting. \par no change in bowel. \par she has no pain at night and is able to sleep well. \par \par She had recent Abd US- notable for known pancreatic cyst , MRI pending. She has not yet sent prior records for comparison. \par She had TVUS showed L ovarian cyst, she will have repeat US again in 6 months. \par \par RLQ pain that she previously reported has improved, unclear what resolved this. \par She saw CRS after last visit and had unremarkable evaluation. \par \par \par prior hx--previously seen for multiple GI complaints. \par previously followed with Dr. Bustamante and Dr. Cullen\par \par She has acid reflux/ gastritis, with epigastric pain 30 min after eating. she has been off/on ppi x 4 years. She has tried to stop many times, longest she can stay off of it is 4 months. \par tried to stop ~ 1 month ago\par then has flare of her symptoms 1 month later then has to take it twice a day  and tapers to 20 mg daily. \par she restarted 2 days ago. \par \par she has had 3 prior EGD, last one 3 years ago at [facility]. she has small HH. \par \par RLQ pain off/on for the past decade. she has had prior imaging. it was gone for 3 years. Recently returned.  1 week ago felt like right lower abdomen was raised, then had pain there. She was seen at urgent care, no labs or imaging performed. \par last abd imaging was 2 years ago. \par \par she is seeing gyn in 2 weeks. \par \par No prior colonoscopy\par \par 1 month ago she felt a "tickle" in her rectum. felt small hard white bump in her anus.\par \par since 04/2021, she lost 20 lbs, through diet and exercise. She had  gained weight previously. She now feels she is underweight. \par she feels that 109-110 lbs is her normal weight\par she is now maintaining her weight at ~ 105 lbs\par \par cardio 5d per week, strength 5-6 d per week . \par  \par Advil - few times per week , 3 per day or 2 Aleve. \par \par Patient denies dysphagia/odynophagia, change in bowel habits, diarrhea, constipation, brbpr, melena. Good appetite. Patient has daily BM. \par \par fam hx- negative for cancer

## 2022-01-03 ENCOUNTER — RESULT REVIEW (OUTPATIENT)
Age: 42
End: 2022-01-03

## 2022-01-05 ENCOUNTER — RESULT REVIEW (OUTPATIENT)
Age: 42
End: 2022-01-05

## 2022-01-06 ENCOUNTER — RESULT REVIEW (OUTPATIENT)
Age: 42
End: 2022-01-06

## 2022-01-06 ENCOUNTER — APPOINTMENT (OUTPATIENT)
Dept: GASTROENTEROLOGY | Facility: HOSPITAL | Age: 42
End: 2022-01-06
Payer: COMMERCIAL

## 2022-01-06 PROCEDURE — 45380 COLONOSCOPY AND BIOPSY: CPT

## 2022-01-06 PROCEDURE — 43239 EGD BIOPSY SINGLE/MULTIPLE: CPT

## 2022-01-07 ENCOUNTER — NON-APPOINTMENT (OUTPATIENT)
Age: 42
End: 2022-01-07

## 2022-01-08 ENCOUNTER — RESULT REVIEW (OUTPATIENT)
Age: 42
End: 2022-01-08

## 2022-01-10 ENCOUNTER — RESULT REVIEW (OUTPATIENT)
Age: 42
End: 2022-01-10

## 2022-01-11 ENCOUNTER — APPOINTMENT (OUTPATIENT)
Dept: GASTROENTEROLOGY | Facility: HOSPITAL | Age: 42
End: 2022-01-11

## 2022-01-11 ENCOUNTER — RESULT REVIEW (OUTPATIENT)
Age: 42
End: 2022-01-11

## 2022-01-11 ENCOUNTER — TRANSCRIPTION ENCOUNTER (OUTPATIENT)
Age: 42
End: 2022-01-11

## 2022-01-13 ENCOUNTER — NON-APPOINTMENT (OUTPATIENT)
Age: 42
End: 2022-01-13

## 2022-01-23 ENCOUNTER — NON-APPOINTMENT (OUTPATIENT)
Age: 42
End: 2022-01-23

## 2022-01-26 ENCOUNTER — APPOINTMENT (OUTPATIENT)
Dept: SURGERY | Facility: CLINIC | Age: 42
End: 2022-01-26
Payer: COMMERCIAL

## 2022-01-26 VITALS
HEIGHT: 62.5 IN | HEART RATE: 97 BPM | WEIGHT: 102 LBS | DIASTOLIC BLOOD PRESSURE: 91 MMHG | BODY MASS INDEX: 18.3 KG/M2 | SYSTOLIC BLOOD PRESSURE: 139 MMHG | TEMPERATURE: 97 F

## 2022-01-26 DIAGNOSIS — K80.50 CALCULUS OF BILE DUCT W/OUT CHOLANGITIS OR CHOLECYSTITIS W/OUT OBSTRUCTION: ICD-10-CM

## 2022-01-26 PROCEDURE — 99214 OFFICE O/P EST MOD 30 MIN: CPT

## 2022-01-26 NOTE — CONSULT LETTER
[Dear  ___] : Dear  [unfilled], [( Thank you for referring [unfilled] for consultation for _____ )] : Thank you for referring [unfilled] for consultation for [unfilled] [Please see my note below.] : Please see my note below. [Consult Closing:] : Thank you very much for allowing me to participate in the care of this patient.  If you have any questions, please do not hesitate to contact me. [Sincerely,] : Sincerely, [FreeTextEntry3] : Clarisse Cash MD [DrRamiro  ___] : Dr. PAIGE [DrRamiro ___] : Dr. PAIGE

## 2022-01-26 NOTE — PHYSICAL EXAM
[Respiratory Effort] : normal respiratory effort [Normal Rate and Rhythm] : normal rate and rhythm [No Rash or Lesion] : No rash or lesion [Alert] : alert [Calm] : calm [de-identified] : NAD, well-appearing [de-identified] : Anicteric [de-identified] : soft, nontender, nondistended

## 2022-01-26 NOTE — ASSESSMENT
[FreeTextEntry1] : 40 yo F with biliary colic.\par \par We had a long discussion about the anatomy and pathophysiology of the biliary system. We discussed the manifestation of gallstones including biliary colic, acute cholecystitis, choledocholithiasis, cholangitis and pancreatitis. I have recommended laparoscopic/robotic cholecystectomy. We discussed the risks and benefits including infection, bleeding, injury to vital structures. We also discussed the expected recovery as well as possible side effects of having the gallbladder removed. The patient is in agreement to proceed.\par Surgery would be performed at [facility] under general anesthesia as an ambulatory procedure. \par Patient will obtain medical clearance prior to surgery. \par

## 2022-01-26 NOTE — HISTORY OF PRESENT ILLNESS
[de-identified] : 42 yo F with h/o GERD who presents referred by Dr. Rudolph Clarek for evaluation for recurrent biliary colic. The patient presented with RUQ pain radiating to her back that was significant on a couple of occasions. She underwent w/u including abdominal US, MRI and EUS. This workup revealed a small pancreatic body cyst that had benign cytologic characteristics on FNA as well as sludge in the GB. There was no evidence of gastritis or duodenitis on endoscopy. The patient has had less pain since starting a low fat diet but continues to have mild discomfort in the RUQ. She has no fevers, chills, diarrhea or other symptoms.

## 2022-02-08 ENCOUNTER — NON-APPOINTMENT (OUTPATIENT)
Age: 42
End: 2022-02-08

## 2022-02-08 ENCOUNTER — RESULT REVIEW (OUTPATIENT)
Age: 42
End: 2022-02-08

## 2022-02-08 DIAGNOSIS — Z90.49 ACQUIRED ABSENCE OF OTHER SPECIFIED PARTS OF DIGESTIVE TRACT: ICD-10-CM

## 2022-02-09 ENCOUNTER — APPOINTMENT (OUTPATIENT)
Dept: INTERNAL MEDICINE | Facility: CLINIC | Age: 42
End: 2022-02-09
Payer: COMMERCIAL

## 2022-02-09 PROCEDURE — 36415 COLL VENOUS BLD VENIPUNCTURE: CPT

## 2022-02-10 LAB
ALBUMIN SERPL ELPH-MCNC: 5.1 G/DL
ALP BLD-CCNC: 74 U/L
ALT SERPL-CCNC: 21 U/L
AMYLASE/CREAT SERPL: 78 U/L
ANION GAP SERPL CALC-SCNC: 14 MMOL/L
AST SERPL-CCNC: 15 U/L
BASOPHILS # BLD AUTO: 0.04 K/UL
BASOPHILS NFR BLD AUTO: 1 %
BILIRUB DIRECT SERPL-MCNC: 0.1 MG/DL
BILIRUB SERPL-MCNC: 0.2 MG/DL
BUN SERPL-MCNC: 10 MG/DL
CALCIUM SERPL-MCNC: 10.3 MG/DL
CHLORIDE SERPL-SCNC: 101 MMOL/L
CO2 SERPL-SCNC: 25 MMOL/L
CREAT SERPL-MCNC: 0.67 MG/DL
EOSINOPHIL # BLD AUTO: 0.05 K/UL
EOSINOPHIL NFR BLD AUTO: 1.2 %
GLUCOSE SERPL-MCNC: 82 MG/DL
HCT VFR BLD CALC: 39.6 %
HGB BLD-MCNC: 12.4 G/DL
IMM GRANULOCYTES NFR BLD AUTO: 0.5 %
LPL SERPL-CCNC: 48 U/L
LYMPHOCYTES # BLD AUTO: 1.47 K/UL
LYMPHOCYTES NFR BLD AUTO: 34.9 %
MAN DIFF?: NORMAL
MCHC RBC-ENTMCNC: 28.4 PG
MCHC RBC-ENTMCNC: 31.3 GM/DL
MCV RBC AUTO: 90.6 FL
MONOCYTES # BLD AUTO: 0.43 K/UL
MONOCYTES NFR BLD AUTO: 10.2 %
NEUTROPHILS # BLD AUTO: 2.2 K/UL
NEUTROPHILS NFR BLD AUTO: 52.2 %
PLATELET # BLD AUTO: 289 K/UL
POTASSIUM SERPL-SCNC: 5 MMOL/L
PROT SERPL-MCNC: 8 G/DL
RBC # BLD: 4.37 M/UL
RBC # FLD: 13.9 %
SODIUM SERPL-SCNC: 140 MMOL/L
WBC # FLD AUTO: 4.21 K/UL

## 2022-02-11 ENCOUNTER — APPOINTMENT (OUTPATIENT)
Dept: SURGERY | Facility: HOSPITAL | Age: 42
End: 2022-02-11

## 2022-02-16 ENCOUNTER — APPOINTMENT (OUTPATIENT)
Dept: SURGERY | Facility: CLINIC | Age: 42
End: 2022-02-16
Payer: COMMERCIAL

## 2022-02-16 VITALS
TEMPERATURE: 97.2 F | WEIGHT: 102 LBS | BODY MASS INDEX: 18.77 KG/M2 | HEIGHT: 62 IN | HEART RATE: 96 BPM | SYSTOLIC BLOOD PRESSURE: 118 MMHG | DIASTOLIC BLOOD PRESSURE: 82 MMHG

## 2022-02-16 DIAGNOSIS — G89.29 UNSPECIFIED ABDOMINAL PAIN: ICD-10-CM

## 2022-02-16 DIAGNOSIS — R10.9 UNSPECIFIED ABDOMINAL PAIN: ICD-10-CM

## 2022-02-16 PROCEDURE — 99024 POSTOP FOLLOW-UP VISIT: CPT

## 2022-02-16 NOTE — PHYSICAL EXAM
[Respiratory Effort] : normal respiratory effort [Normal Rate and Rhythm] : normal rate and rhythm [Calm] : calm [de-identified] : NAD, well-appearing [de-identified] : Anicteric [de-identified] : soft, nontender, nondistended with well-healing incisions c/d/i

## 2022-02-16 NOTE — ASSESSMENT
[FreeTextEntry1] : 42 yo F s/p lap cholecystectomy for RUQ pain and biliary sludge on EUS with persistent RUQ discomfort, though different than prior to surgery.\par Recommend observation for 2-3 more weeks. If pain persists, can obtain HIDA to rule out sphincter of Oddi dysfunction and follow up with GI as well.

## 2022-02-16 NOTE — HISTORY OF PRESENT ILLNESS
[de-identified] : Patient returns s/p lap cholecystectomy on 2/2/22. She is doing ok but has intermittent RUQ gassy pain. We have obtained labs and an US postop due to these symptoms but those are normal. She has no fevers, chills, nausea or vomiting. She has been moving her bowels well. Her pains are not clearly related to eating.

## 2022-02-16 NOTE — CONSULT LETTER
[Dear  ___] : Dear  [unfilled], [Courtesy Letter:] : I had the pleasure of seeing your patient, [unfilled], in my office today. [Consult Closing:] : Thank you very much for allowing me to participate in the care of this patient.  If you have any questions, please do not hesitate to contact me. [Sincerely,] : Sincerely, [FreeTextEntry1] : Enclosed please find my operative report, pathology report and postop visit note.  [FreeTextEntry3] : Clarisse Cash MD [DrRamiro  ___] : Dr. PAIGE [DrRamiro ___] : Dr. PAIGE

## 2022-04-08 ENCOUNTER — APPOINTMENT (OUTPATIENT)
Dept: GASTROENTEROLOGY | Facility: CLINIC | Age: 42
End: 2022-04-08

## 2022-05-02 ENCOUNTER — APPOINTMENT (OUTPATIENT)
Dept: OBGYN | Facility: CLINIC | Age: 42
End: 2022-05-02

## 2022-05-02 ENCOUNTER — NON-APPOINTMENT (OUTPATIENT)
Age: 42
End: 2022-05-02

## 2022-05-02 ENCOUNTER — APPOINTMENT (OUTPATIENT)
Dept: OBGYN | Facility: CLINIC | Age: 42
End: 2022-05-02
Payer: COMMERCIAL

## 2022-05-02 VITALS
BODY MASS INDEX: 18.4 KG/M2 | SYSTOLIC BLOOD PRESSURE: 96 MMHG | WEIGHT: 100 LBS | DIASTOLIC BLOOD PRESSURE: 60 MMHG | HEIGHT: 62 IN

## 2022-05-02 DIAGNOSIS — Z12.39 ENCOUNTER FOR OTHER SCREENING FOR MALIGNANT NEOPLASM OF BREAST: ICD-10-CM

## 2022-05-02 DIAGNOSIS — N94.9 UNSPECIFIED CONDITION ASSOCIATED WITH FEMALE GENITAL ORGANS AND MENSTRUAL CYCLE: ICD-10-CM

## 2022-05-02 DIAGNOSIS — Z01.419 ENCOUNTER FOR GYNECOLOGICAL EXAMINATION (GENERAL) (ROUTINE) W/OUT ABNORMAL FINDINGS: ICD-10-CM

## 2022-05-02 PROCEDURE — 99396 PREV VISIT EST AGE 40-64: CPT

## 2022-05-02 NOTE — HISTORY OF PRESENT ILLNESS
[TextBox_4] : 41 year old  for annual exam. 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 Due for mammogram. Had Pap 12/21 that was negative. Periods mostly regular but notes having increasing days of spotting prior to the onset of her period, making them last up to 10 days over the last few months. Bleeding is not heavy. Had gallbladder surgery in February of this year; has had unintentional 22 lbs weight loss since one year ago. s/p gyn and GI workup which were negative aside from gallbladder sludge. Plans to revisit with her PCP.  [PapSmeardate] : 12/2021 [TextBox_31] : NILM, HPV negative

## 2022-05-06 ENCOUNTER — APPOINTMENT (OUTPATIENT)
Dept: FAMILY MEDICINE | Facility: CLINIC | Age: 42
End: 2022-05-06
Payer: COMMERCIAL

## 2022-05-06 VITALS
WEIGHT: 99 LBS | DIASTOLIC BLOOD PRESSURE: 62 MMHG | RESPIRATION RATE: 17 BRPM | HEIGHT: 62 IN | TEMPERATURE: 98.5 F | HEART RATE: 107 BPM | BODY MASS INDEX: 18.22 KG/M2 | OXYGEN SATURATION: 100 % | SYSTOLIC BLOOD PRESSURE: 90 MMHG

## 2022-05-06 DIAGNOSIS — R63.4 ABNORMAL WEIGHT LOSS: ICD-10-CM

## 2022-05-06 DIAGNOSIS — R51.9 HEADACHE, UNSPECIFIED: ICD-10-CM

## 2022-05-06 PROCEDURE — 99215 OFFICE O/P EST HI 40 MIN: CPT

## 2022-05-10 PROBLEM — R63.4 WEIGHT LOSS: Status: ACTIVE | Noted: 2021-11-18

## 2022-05-10 PROBLEM — R51.9 HEADACHE: Status: ACTIVE | Noted: 2021-04-26

## 2022-05-10 NOTE — PHYSICAL EXAM
[No Acute Distress] : no acute distress [Well Nourished] : well nourished [Well Developed] : well developed [Well-Appearing] : well-appearing [Normal Sclera/Conjunctiva] : normal sclera/conjunctiva [PERRL] : pupils equal round and reactive to light [EOMI] : extraocular movements intact [Normal Outer Ear/Nose] : the outer ears and nose were normal in appearance [Normal Oropharynx] : the oropharynx was normal [No JVD] : no jugular venous distention [No Lymphadenopathy] : no lymphadenopathy [Supple] : supple [Thyroid Normal, No Nodules] : the thyroid was normal and there were no nodules present [No Respiratory Distress] : no respiratory distress  [No Accessory Muscle Use] : no accessory muscle use [Clear to Auscultation] : lungs were clear to auscultation bilaterally [Normal Rate] : normal rate  [Regular Rhythm] : with a regular rhythm [Normal S1, S2] : normal S1 and S2 [No Murmur] : no murmur heard [No Carotid Bruits] : no carotid bruits [No Abdominal Bruit] : a ~M bruit was not heard ~T in the abdomen [No Varicosities] : no varicosities [Pedal Pulses Present] : the pedal pulses are present [No Edema] : there was no peripheral edema [No Palpable Aorta] : no palpable aorta [No Extremity Clubbing/Cyanosis] : no extremity clubbing/cyanosis [Soft] : abdomen soft [Non Tender] : non-tender [Non-distended] : non-distended [No Masses] : no abdominal mass palpated [No HSM] : no HSM [Normal Bowel Sounds] : normal bowel sounds [Normal Posterior Cervical Nodes] : no posterior cervical lymphadenopathy [Normal Anterior Cervical Nodes] : no anterior cervical lymphadenopathy [No CVA Tenderness] : no CVA  tenderness [No Spinal Tenderness] : no spinal tenderness [No Joint Swelling] : no joint swelling [Grossly Normal Strength/Tone] : grossly normal strength/tone [No Rash] : no rash [Coordination Grossly Intact] : coordination grossly intact [No Focal Deficits] : no focal deficits [Normal Gait] : normal gait [Deep Tendon Reflexes (DTR)] : deep tendon reflexes were 2+ and symmetric [Normal Affect] : the affect was normal [Normal Insight/Judgement] : insight and judgment were intact [de-identified] : Anxiety

## 2022-05-18 ENCOUNTER — RESULT REVIEW (OUTPATIENT)
Age: 42
End: 2022-05-18

## 2022-07-12 ENCOUNTER — APPOINTMENT (OUTPATIENT)
Dept: GASTROENTEROLOGY | Facility: CLINIC | Age: 42
End: 2022-07-12

## 2022-07-12 VITALS
OXYGEN SATURATION: 99 % | WEIGHT: 99 LBS | HEART RATE: 88 BPM | TEMPERATURE: 98.5 F | HEIGHT: 62 IN | DIASTOLIC BLOOD PRESSURE: 75 MMHG | SYSTOLIC BLOOD PRESSURE: 120 MMHG | BODY MASS INDEX: 18.22 KG/M2

## 2022-07-12 PROCEDURE — 99214 OFFICE O/P EST MOD 30 MIN: CPT

## 2022-07-12 NOTE — ASSESSMENT
[FreeTextEntry1] : Suspect pain is due to gas formation given her history.  Discussed dietary changes.  Will research the low FODMAP diet, although will not adhere to this. Will use it as a guide as to certain gas forming foods that may be triggering her symptoms.  If no improvement, will f/u.\par \par Do not suspect her symptoms are biliary in nature.

## 2022-07-12 NOTE — HISTORY OF PRESENT ILLNESS
[FreeTextEntry1] : Ms. Jolly returns for f/u today.\par \par She underwent a laparoscopic cholecystectomy with Dr. Cash 2/22 shortly after undergoing an upper EUS with myself having found GB sludge. She was experiencing RUQ pain at that time.\par \par Her prior RUQ pain improved, however she has been having on and off gassy discomfort in her RUQ since then.  She has been avoiding fatty foods, however does not think fatty foods have exacerbated her symptoms.  She has been having difficulty gaining weight, however has not lost any weight.\par \par Does report certain foods such as broccoli have exacerbated symptoms, although has not tried with specific dietary changes.\par \par Normal BMs daily, started fiber supplements, reports large BMs daily.\par \par Prior procedures:\par Cholecystectomy (Dr. Cash) 2/22\par EGD/EUS 1/11/22 ([facility]): GB sludge, 1.1 cm HOP cyst (very low risk on fluid analysis - plan for repeat MRI 1/24)\par EGD/colonoscopy 1/6/22 ([facility]): irregular Z-line, hiatal hernia, 5 mm sigmoid polyp, int hemorrhoids

## 2022-08-23 PROBLEM — Z12.39 SCREENING FOR MALIGNANT NEOPLASM OF BREAST: Status: ACTIVE | Noted: 2022-08-23

## 2022-11-17 ENCOUNTER — APPOINTMENT (OUTPATIENT)
Dept: OBGYN | Facility: CLINIC | Age: 42
End: 2022-11-17

## 2022-11-19 ENCOUNTER — RX RENEWAL (OUTPATIENT)
Age: 42
End: 2022-11-19

## 2023-01-12 ENCOUNTER — APPOINTMENT (OUTPATIENT)
Dept: GASTROENTEROLOGY | Facility: CLINIC | Age: 43
End: 2023-01-12

## 2023-05-01 ENCOUNTER — APPOINTMENT (OUTPATIENT)
Dept: OBGYN | Facility: CLINIC | Age: 43
End: 2023-05-01

## 2023-05-05 ENCOUNTER — APPOINTMENT (OUTPATIENT)
Dept: GASTROENTEROLOGY | Facility: CLINIC | Age: 43
End: 2023-05-05
Payer: COMMERCIAL

## 2023-05-05 VITALS
HEART RATE: 74 BPM | SYSTOLIC BLOOD PRESSURE: 130 MMHG | HEIGHT: 62 IN | DIASTOLIC BLOOD PRESSURE: 83 MMHG | OXYGEN SATURATION: 100 % | BODY MASS INDEX: 18.77 KG/M2 | RESPIRATION RATE: 18 BRPM | WEIGHT: 102 LBS

## 2023-05-05 DIAGNOSIS — R10.11 RIGHT UPPER QUADRANT PAIN: ICD-10-CM

## 2023-05-05 DIAGNOSIS — K21.9 GASTRO-ESOPHAGEAL REFLUX DISEASE W/OUT ESOPHAGITIS: ICD-10-CM

## 2023-05-05 PROCEDURE — 99214 OFFICE O/P EST MOD 30 MIN: CPT

## 2023-05-05 RX ORDER — FAMOTIDINE 20 MG/1
20 TABLET, FILM COATED ORAL TWICE DAILY
Qty: 180 | Refills: 3 | Status: ACTIVE | COMMUNITY
Start: 2023-05-05 | End: 1900-01-01

## 2023-05-05 NOTE — HISTORY OF PRESENT ILLNESS
[FreeTextEntry1] : Ms. Jolly returns for f/u today.\par \par She underwent a laparoscopic cholecystectomy with Dr. Cash 2/22 shortly after undergoing an upper EUS with myself having found GB sludge. She was experiencing RUQ pain at that time.\par \par Her prior RUQ improved, however she is now having a burning discomfort starting in her RUQ, minimal local radiation. Worse early in the morning, sometimes exacerbated by eating with worsening an hour after eating, however tends to dissipate as the day goes on, very little discomfort later in the day.\par \par Has been awoken from sleep a few times with the discomfort.\par \par No heartburn, regurgitation, weight change, dysphagia, odynophagia.\par \par Symptoms began 11/22.\par \par If she takes omeprazole 40mg daily, her symptoms improve significantly after 4-6 weeks of daily use. Has not been able to stop the medication, as each time she does her symptoms recur within several day.\par \par Normal BMs daily, no blood, no black stool. No diarrhea, constipation.\par \par Prior procedures:\par Cholecystectomy (Dr. Cash) 2/22\par EGD/EUS 1/11/22 ([facility]): GB sludge, 1.1 cm HOP cyst (very low risk on fluid analysis - plan for repeat MRI 1/24)\par EGD/colonoscopy 1/6/22 ([facility]): irregular Z-line, hiatal hernia, 5 mm sigmoid polyp, int hemorrhoids

## 2023-05-05 NOTE — ASSESSMENT
[FreeTextEntry1] : Likely acid related discomfort, however will obtain an abd U/S for further assessment, r/o biliary or pancreatic abnormalities (post cholecystectomy)\par \par Will start famotidine 20mg twice daily. Will overlap with omeprazole for 1 week, then taper her off of omeprazole.

## 2023-05-10 ENCOUNTER — RESULT REVIEW (OUTPATIENT)
Age: 43
End: 2023-05-10

## 2023-05-19 ENCOUNTER — RESULT REVIEW (OUTPATIENT)
Age: 43
End: 2023-05-19

## 2023-05-23 ENCOUNTER — TRANSCRIPTION ENCOUNTER (OUTPATIENT)
Age: 43
End: 2023-05-23

## 2023-05-23 RX ORDER — DICYCLOMINE HYDROCHLORIDE 20 MG/1
20 TABLET ORAL TWICE DAILY
Qty: 60 | Refills: 3 | Status: ACTIVE | COMMUNITY
Start: 2023-05-23 | End: 1900-01-01

## 2023-05-24 ENCOUNTER — TRANSCRIPTION ENCOUNTER (OUTPATIENT)
Age: 43
End: 2023-05-24

## 2023-06-07 ENCOUNTER — RX RENEWAL (OUTPATIENT)
Age: 43
End: 2023-06-07

## 2023-07-14 ENCOUNTER — APPOINTMENT (OUTPATIENT)
Dept: GASTROENTEROLOGY | Facility: CLINIC | Age: 43
End: 2023-07-14

## 2023-09-26 ENCOUNTER — TRANSCRIPTION ENCOUNTER (OUTPATIENT)
Age: 43
End: 2023-09-26

## 2023-09-27 ENCOUNTER — TRANSCRIPTION ENCOUNTER (OUTPATIENT)
Age: 43
End: 2023-09-27

## 2024-01-08 ENCOUNTER — RX RENEWAL (OUTPATIENT)
Age: 44
End: 2024-01-08

## 2024-01-08 RX ORDER — OMEPRAZOLE 20 MG/1
20 CAPSULE, DELAYED RELEASE ORAL
Qty: 30 | Refills: 6 | Status: ACTIVE | COMMUNITY
Start: 2021-11-17 | End: 1900-01-01

## 2024-01-17 ENCOUNTER — RESULT REVIEW (OUTPATIENT)
Age: 44
End: 2024-01-17

## 2024-01-25 ENCOUNTER — TRANSCRIPTION ENCOUNTER (OUTPATIENT)
Age: 44
End: 2024-01-25

## 2024-01-25 ENCOUNTER — NON-APPOINTMENT (OUTPATIENT)
Age: 44
End: 2024-01-25

## 2024-03-22 ENCOUNTER — TRANSCRIPTION ENCOUNTER (OUTPATIENT)
Age: 44
End: 2024-03-22

## 2024-09-04 ENCOUNTER — NON-APPOINTMENT (OUTPATIENT)
Age: 44
End: 2024-09-04

## 2024-09-05 ENCOUNTER — APPOINTMENT (OUTPATIENT)
Dept: GASTROENTEROLOGY | Facility: CLINIC | Age: 44
End: 2024-09-05
Payer: COMMERCIAL

## 2024-09-05 VITALS
HEART RATE: 105 BPM | BODY MASS INDEX: 20.61 KG/M2 | WEIGHT: 112 LBS | OXYGEN SATURATION: 97 % | SYSTOLIC BLOOD PRESSURE: 110 MMHG | DIASTOLIC BLOOD PRESSURE: 70 MMHG | HEIGHT: 62 IN

## 2024-09-05 DIAGNOSIS — Z87.898 PERSONAL HISTORY OF OTHER SPECIFIED CONDITIONS: ICD-10-CM

## 2024-09-05 DIAGNOSIS — G89.29 UNSPECIFIED ABDOMINAL PAIN: ICD-10-CM

## 2024-09-05 DIAGNOSIS — K86.2 CYST OF PANCREAS: ICD-10-CM

## 2024-09-05 DIAGNOSIS — Z90.49 ACQUIRED ABSENCE OF OTHER SPECIFIED PARTS OF DIGESTIVE TRACT: ICD-10-CM

## 2024-09-05 DIAGNOSIS — K21.9 GASTRO-ESOPHAGEAL REFLUX DISEASE W/OUT ESOPHAGITIS: ICD-10-CM

## 2024-09-05 DIAGNOSIS — R14.0 ABDOMINAL DISTENSION (GASEOUS): ICD-10-CM

## 2024-09-05 DIAGNOSIS — Z87.19 PERSONAL HISTORY OF OTHER DISEASES OF THE DIGESTIVE SYSTEM: ICD-10-CM

## 2024-09-05 DIAGNOSIS — K62.5 HEMORRHAGE OF ANUS AND RECTUM: ICD-10-CM

## 2024-09-05 DIAGNOSIS — R10.11 RIGHT UPPER QUADRANT PAIN: ICD-10-CM

## 2024-09-05 DIAGNOSIS — R10.9 UNSPECIFIED ABDOMINAL PAIN: ICD-10-CM

## 2024-09-05 PROCEDURE — G2211 COMPLEX E/M VISIT ADD ON: CPT | Mod: NC

## 2024-09-05 PROCEDURE — 99214 OFFICE O/P EST MOD 30 MIN: CPT

## 2024-09-05 NOTE — HISTORY OF PRESENT ILLNESS
[FreeTextEntry1] : Ms. Jolly returns for f/u today. She has most recently been seen by myself for pancreatic cysts. Underwent an MRI abd with contrast 1/24 showing decrease in size of her cysts (0.4 and 0.3 cm, previously larger). Determined can wait 2 years for repeat imaging (January 2026). Now returns as she has occasionally seen small amounts of blood only when wiping after having a BM. Stool has been brown, formed, no blood in the stool or toilet water. No black stool. No change in weight. Occasionally has lower abd crampy discomfort if she eats certain foods, knows it is diet related. Has had unchanged epigastric/RUQ discomfort if she decreases her omeprazole, has had this for years, has continued her omeprazole with good symptom control.   Prior procedures: Cholecystectomy (Dr. Cash) 2/22 EGD/EUS 1/11/22 ([facility]): GB sludge, 1.1 cm HOP cyst (very low risk on fluid analysis - plan for repeat MRI 1/24) EGD/colonoscopy 1/6/22 ([facility]): irregular Z-line, hiatal hernia, 5 mm sigmoid polyp (non-adenomatous), int hemorrhoids (random colon biopsies suggestive of lymphocytic colitis in the correct clinical setting)  Does not smoke, drinks rarely and socially. No FHx of any GI malignancies.

## 2024-09-05 NOTE — ASSESSMENT
[FreeTextEntry1] : Continue with omeprazole as prescribed. Repeat MRI abd 1/26 for pancreatic cyst surveillance.  Rectal bleeding very likely due to her previously diagnosed internal hemorrhoids. Could repeat a colonoscopy given that she is now 43, rectal bleeding, prior colonoscopy biopsies showing findings c/w lymphocytic colitis (no diarrhea to suggest the diagnosis). Will call back if she wishes to proceed.

## 2024-09-11 ENCOUNTER — TRANSCRIPTION ENCOUNTER (OUTPATIENT)
Age: 44
End: 2024-09-11

## 2024-09-12 ENCOUNTER — TRANSCRIPTION ENCOUNTER (OUTPATIENT)
Age: 44
End: 2024-09-12

## 2024-09-30 ENCOUNTER — NON-APPOINTMENT (OUTPATIENT)
Age: 44
End: 2024-09-30

## 2024-10-01 ENCOUNTER — NON-APPOINTMENT (OUTPATIENT)
Age: 44
End: 2024-10-01

## 2024-10-01 DIAGNOSIS — R92.30 DENSE BREASTS, UNSPECIFIED: ICD-10-CM

## 2024-10-02 ENCOUNTER — APPOINTMENT (OUTPATIENT)
Dept: BREAST CENTER | Facility: CLINIC | Age: 44
End: 2024-10-02
Payer: COMMERCIAL

## 2024-10-02 VITALS
BODY MASS INDEX: 19.49 KG/M2 | SYSTOLIC BLOOD PRESSURE: 118 MMHG | WEIGHT: 110 LBS | HEART RATE: 91 BPM | HEIGHT: 63 IN | DIASTOLIC BLOOD PRESSURE: 75 MMHG

## 2024-10-02 DIAGNOSIS — Z83.438 FAMILY HISTORY OF OTHER DISORDER OF LIPOPROTEIN METABOLISM AND OTHER LIPIDEMIA: ICD-10-CM

## 2024-10-02 DIAGNOSIS — R92.30 INCONCLUSIVE MAMMOGRAM: ICD-10-CM

## 2024-10-02 DIAGNOSIS — Z91.89 OTHER SPECIFIED PERSONAL RISK FACTORS, NOT ELSEWHERE CLASSIFIED: ICD-10-CM

## 2024-10-02 DIAGNOSIS — Z78.9 OTHER SPECIFIED HEALTH STATUS: ICD-10-CM

## 2024-10-02 DIAGNOSIS — Z82.49 FAMILY HISTORY OF ISCHEMIC HEART DISEASE AND OTHER DISEASES OF THE CIRCULATORY SYSTEM: ICD-10-CM

## 2024-10-02 DIAGNOSIS — Z12.31 ENCOUNTER FOR SCREENING MAMMOGRAM FOR MALIGNANT NEOPLASM OF BREAST: ICD-10-CM

## 2024-10-02 DIAGNOSIS — Z87.2 PERSONAL HISTORY OF DISEASES OF THE SKIN AND SUBCUTANEOUS TISSUE: ICD-10-CM

## 2024-10-02 DIAGNOSIS — Z87.898 PERSONAL HISTORY OF OTHER SPECIFIED CONDITIONS: ICD-10-CM

## 2024-10-02 DIAGNOSIS — R92.2 INCONCLUSIVE MAMMOGRAM: ICD-10-CM

## 2024-10-02 PROCEDURE — 99214 OFFICE O/P EST MOD 30 MIN: CPT

## 2024-10-03 NOTE — PHYSICAL EXAM
[Normocephalic] : normocephalic [Atraumatic] : atraumatic [Supple] : supple [No Supraclavicular Adenopathy] : no supraclavicular adenopathy [Examined in the supine and seated position] : examined in the supine and seated position [Symmetrical] : symmetrical [No dominant masses] : no dominant masses in right breast  [No dominant masses] : no dominant masses left breast [No Nipple Retraction] : no left nipple retraction [No Nipple Discharge] : no left nipple discharge [No Axillary Lymphadenopathy] : no left axillary lymphadenopathy [No Edema] : no edema [No Rashes] : no rashes [No Ulceration] : no ulceration [de-identified] : FGC's uoq bilaterally

## 2024-10-03 NOTE — ASSESSMENT
[FreeTextEntry1] : 44 yo female with high risk reviewed her risk status reviewed her past biopsies and pathology We reviewed risk reduction strategies including maintaining a BMI <25, limiting red meat intake and alcoholic beverages to 3 per week and exercise (150 min/ week low intensity or 75 min/week high intensity). And maintaining a normal vitamin D level and stress management strategies she inquired about CPM and we discussed the options  discussed MRI, she is amenable to this, reviewed risk of false positives plan MRI asap if negative plan GENE 6/1/2025 plan mg/sono 6/1/2025 GENE if imaging negative, she will see gyn in 6 months me in 1 year She knows to call or return sooner should any concerns or questions arise.

## 2024-10-03 NOTE — ASSESSMENT
[FreeTextEntry1] : 42 yo female with high risk reviewed her risk status reviewed her past biopsies and pathology We reviewed risk reduction strategies including maintaining a BMI <25, limiting red meat intake and alcoholic beverages to 3 per week and exercise (150 min/ week low intensity or 75 min/week high intensity). And maintaining a normal vitamin D level and stress management strategies she inquired about CPM and we discussed the options  discussed MRI, she is amenable to this, reviewed risk of false positives plan MRI asap if negative plan GENE 6/1/2025 plan mg/sono 6/1/2025 GENE if imaging negative, she will see gyn in 6 months me in 1 year She knows to call or return sooner should any concerns or questions arise.

## 2024-10-03 NOTE — PHYSICAL EXAM
[Normocephalic] : normocephalic [Atraumatic] : atraumatic [Supple] : supple [No Supraclavicular Adenopathy] : no supraclavicular adenopathy [Examined in the supine and seated position] : examined in the supine and seated position [Symmetrical] : symmetrical [No dominant masses] : no dominant masses in right breast  [No dominant masses] : no dominant masses left breast [No Nipple Retraction] : no left nipple retraction [No Nipple Discharge] : no left nipple discharge [No Axillary Lymphadenopathy] : no left axillary lymphadenopathy [No Edema] : no edema [No Rashes] : no rashes [No Ulceration] : no ulceration [de-identified] : FGC's uoq bilaterally

## 2024-10-03 NOTE — ASSESSMENT
[FreeTextEntry1] : 44 yo female with high risk reviewed her risk status reviewed her past biopsies and pathology We reviewed risk reduction strategies including maintaining a BMI <25, limiting red meat intake and alcoholic beverages to 3 per week and exercise (150 min/ week low intensity or 75 min/week high intensity). And maintaining a normal vitamin D level and stress management strategies she inquired about CPM and we discussed the options  discussed MRI, she is amenable to this, reviewed risk of false positives plan MRI asap if negative plan EGNE 6/1/2025 plan mg/sono 6/1/2025 GENE if imaging negative, she will see gyn in 6 months me in 1 year She knows to call or return sooner should any concerns or questions arise.

## 2024-10-03 NOTE — HISTORY OF PRESENT ILLNESS
[FreeTextEntry1] : 42 yo female presenting today for risk evaluation. She is s/p screening mammogram and US 5/30/2024 at [facility], extremely dense breast tissue on mammogram; BIRADS 2- no mammographic or sonographic evidence of malignancy.  She has a personal history of BL breast US guided biopsies- pathology benign as per patient- done over 10 years ago.  She states she had biopsy and showed FA and PASH. In 2016 she had left breast USGbx 2:00 pathology showed benign fatty tissue no evidence of malignancy.  She states she saw a genetic counselor and is considering testing.   She does not SBE. She has not noticed a change in her breast or a breast lump. She has not noticed a change in her nipple or nipple area. She has not noticed a change in the skin of the breast. She is not experiencing nipple discharge. She is not experiencing breast pain. She has left breast ache with pressure, thinks it's muscular She has not noticed a lump or lymph node under the armpit.   BREAST CANCER RISK FACTORS Menarche: 11 LMP: 09/08/2024 Menopause: pre Grav: 2  Para: 2 (8, 10 yo) Age at first live birth: 30  Nursed: yes  Hysterectomy: no  Oophorectomy: no  OCP: on and off for 4-6 years  HRT: no Last pap/pelvic exam: 5/2024 Related family history: none Ashkenazi: Yes Jose Michaels v8 risk assessment: 28.2% Genetic testing: not yet, had counseling Bra size:36B   Last mammogram: 05/30/2024      Location: [facility]  Report reviewed.     Images reviewed on PACS Results: BIRADS 2  breasts are extremely dense, no mammographic evidence of malignancy    Last ultrasound: 05/30/2024     Location: [facility]  Report reviewed.     Images reviewed on PACS Results: BIRADS 2  no sonographic evidence of malignancy    Last MRI:  never     
[FreeTextEntry1] : 42 yo female presenting today for risk evaluation. She is s/p screening mammogram and US 5/30/2024 at [facility], extremely dense breast tissue on mammogram; BIRADS 2- no mammographic or sonographic evidence of malignancy.  She has a personal history of BL breast US guided biopsies- pathology benign as per patient- done over 10 years ago.  She states she had biopsy and showed FA and PASH. In 2016 she had left breast USGbx 2:00 pathology showed benign fatty tissue no evidence of malignancy.  She states she saw a genetic counselor and is considering testing.   She does not SBE. She has not noticed a change in her breast or a breast lump. She has not noticed a change in her nipple or nipple area. She has not noticed a change in the skin of the breast. She is not experiencing nipple discharge. She is not experiencing breast pain. She has left breast ache with pressure, thinks it's muscular She has not noticed a lump or lymph node under the armpit.   BREAST CANCER RISK FACTORS Menarche: 11 LMP: 09/08/2024 Menopause: pre Grav: 2  Para: 2 (8, 10 yo) Age at first live birth: 30  Nursed: yes  Hysterectomy: no  Oophorectomy: no  OCP: on and off for 4-6 years  HRT: no Last pap/pelvic exam: 5/2024 Related family history: none Ashkenazi: Yes Jose Michaels v8 risk assessment: 28.2% Genetic testing: not yet, had counseling Bra size:36B   Last mammogram: 05/30/2024      Location: [facility]  Report reviewed.     Images reviewed on PACS Results: BIRADS 2  breasts are extremely dense, no mammographic evidence of malignancy    Last ultrasound: 05/30/2024     Location: [facility]  Report reviewed.     Images reviewed on PACS Results: BIRADS 2  no sonographic evidence of malignancy    Last MRI:  never     
[FreeTextEntry1] : 44 yo female presenting today for risk evaluation. She is s/p screening mammogram and US 5/30/2024 at [facility], extremely dense breast tissue on mammogram; BIRADS 2- no mammographic or sonographic evidence of malignancy.  She has a personal history of BL breast US guided biopsies- pathology benign as per patient- done over 10 years ago.  She states she had biopsy and showed FA and PASH. In 2016 she had left breast USGbx 2:00 pathology showed benign fatty tissue no evidence of malignancy.  She states she saw a genetic counselor and is considering testing.   She does not SBE. She has not noticed a change in her breast or a breast lump. She has not noticed a change in her nipple or nipple area. She has not noticed a change in the skin of the breast. She is not experiencing nipple discharge. She is not experiencing breast pain. She has left breast ache with pressure, thinks it's muscular She has not noticed a lump or lymph node under the armpit.   BREAST CANCER RISK FACTORS Menarche: 11 LMP: 09/08/2024 Menopause: pre Grav: 2  Para: 2 (8, 12 yo) Age at first live birth: 30  Nursed: yes  Hysterectomy: no  Oophorectomy: no  OCP: on and off for 4-6 years  HRT: no Last pap/pelvic exam: 5/2024 Related family history: none Ashkenazi: Yes Jose Michaels v8 risk assessment: 28.2% Genetic testing: not yet, had counseling Bra size:36B   Last mammogram: 05/30/2024      Location: [facility]  Report reviewed.     Images reviewed on PACS Results: BIRADS 2  breasts are extremely dense, no mammographic evidence of malignancy    Last ultrasound: 05/30/2024     Location: [facility]  Report reviewed.     Images reviewed on PACS Results: BIRADS 2  no sonographic evidence of malignancy    Last MRI:  never     
[FreeTextEntry1] : 44 yo female presenting today for risk evaluation. She is s/p screening mammogram and US 5/30/2024 at [facility], extremely dense breast tissue on mammogram; BIRADS 2- no mammographic or sonographic evidence of malignancy.  She has a personal history of BL breast US guided biopsies- pathology benign as per patient- done over 10 years ago.  She states she had biopsy and showed FA and PASH. In 2016 she had left breast USGbx 2:00 pathology showed benign fatty tissue no evidence of malignancy.  She states she saw a genetic counselor and is considering testing.   She does not SBE. She has not noticed a change in her breast or a breast lump. She has not noticed a change in her nipple or nipple area. She has not noticed a change in the skin of the breast. She is not experiencing nipple discharge. She is not experiencing breast pain. She has left breast ache with pressure, thinks it's muscular She has not noticed a lump or lymph node under the armpit.   BREAST CANCER RISK FACTORS Menarche: 11 LMP: 09/08/2024 Menopause: pre Grav: 2  Para: 2 (8, 12 yo) Age at first live birth: 30  Nursed: yes  Hysterectomy: no  Oophorectomy: no  OCP: on and off for 4-6 years  HRT: no Last pap/pelvic exam: 5/2024 Related family history: none Ashkenazi: Yes Jose Michaels v8 risk assessment: 28.2% Genetic testing: not yet, had counseling Bra size:36B   Last mammogram: 05/30/2024      Location: [facility]  Report reviewed.     Images reviewed on PACS Results: BIRADS 2  breasts are extremely dense, no mammographic evidence of malignancy    Last ultrasound: 05/30/2024     Location: [facility]  Report reviewed.     Images reviewed on PACS Results: BIRADS 2  no sonographic evidence of malignancy    Last MRI:  never     
Previously Declined (within the last year)

## 2024-10-03 NOTE — CONSULT LETTER
[Dear  ___] : Dear  [unfilled], [( Thank you for referring [unfilled] for consultation for _____ )] : Thank you for referring [unfilled] for consultation for [unfilled] [Please see my note below.] : Please see my note below. [Consult Closing:] : Thank you very much for allowing me to participate in the care of this patient.  If you have any questions, please do not hesitate to contact me. [Sincerely,] : Sincerely, [FreeTextEntry3] : Flor Ochoa MS [place] [address]

## 2024-10-03 NOTE — CONSULT LETTER
[Dear  ___] : Dear  [unfilled], [( Thank you for referring [unfilled] for consultation for _____ )] : Thank you for referring [unfilled] for consultation for [unfilled] [Please see my note below.] : Please see my note below. [Consult Closing:] : Thank you very much for allowing me to participate in the care of this patient.  If you have any questions, please do not hesitate to contact me. [Sincerely,] : Sincerely, [FreeTextEntry3] : Flor Ochoa [place] [address]

## 2024-10-03 NOTE — CONSULT LETTER
[Dear  ___] : Dear  [unfilled], [( Thank you for referring [unfilled] for consultation for _____ )] : Thank you for referring [unfilled] for consultation for [unfilled] [Please see my note below.] : Please see my note below. [Consult Closing:] : Thank you very much for allowing me to participate in the care of this patient.  If you have any questions, please do not hesitate to contact me. [Sincerely,] : Sincerely, [FreeTextEntry3] : Flor [place] [address]

## 2024-10-03 NOTE — CONSULT LETTER
[Dear  ___] : Dear  [unfilled], [( Thank you for referring [unfilled] for consultation for _____ )] : Thank you for referring [unfilled] for consultation for [unfilled] [Please see my note below.] : Please see my note below. [Consult Closing:] : Thank you very much for allowing me to participate in the care of this patient.  If you have any questions, please do not hesitate to contact me. [Sincerely,] : Sincerely, [FreeTextEntry3] : Flor Ochoa [place] [address]

## 2024-10-03 NOTE — REVIEW OF SYSTEMS
[Dry Eyes] : dryness of the eyes [Heartburn] : heartburn [Negative] : Heme/Lymph [Eye Pain] : no eye pain [Red Eyes] : eyes not red [Eyesight Problems] : no eyesight problems [Discharge From Eyes] : no purulent discharge from the eyes [Eyes Itch] : no itching of the eyes [Abdominal Pain] : no abdominal pain [Vomiting] : no vomiting [Constipation] : no constipation [Melena] : no melena [Diarrhea] : no diarrhea

## 2024-10-03 NOTE — PHYSICAL EXAM
[Normocephalic] : normocephalic [Atraumatic] : atraumatic [Supple] : supple [No Supraclavicular Adenopathy] : no supraclavicular adenopathy [Examined in the supine and seated position] : examined in the supine and seated position [Symmetrical] : symmetrical [No dominant masses] : no dominant masses in right breast  [No dominant masses] : no dominant masses left breast [No Nipple Retraction] : no left nipple retraction [No Nipple Discharge] : no left nipple discharge [No Axillary Lymphadenopathy] : no left axillary lymphadenopathy [No Edema] : no edema [No Rashes] : no rashes [No Ulceration] : no ulceration [de-identified] : FGC's uoq bilaterally

## 2024-10-04 ENCOUNTER — TRANSCRIPTION ENCOUNTER (OUTPATIENT)
Age: 44
End: 2024-10-04

## 2024-10-04 RX ORDER — SUCRALFATE 1 G/1
1 TABLET ORAL
Qty: 120 | Refills: 1 | Status: ACTIVE | COMMUNITY
Start: 2024-10-04 | End: 1900-01-01

## 2024-10-24 ENCOUNTER — NON-APPOINTMENT (OUTPATIENT)
Age: 44
End: 2024-10-24

## 2024-11-18 ENCOUNTER — RESULT REVIEW (OUTPATIENT)
Age: 44
End: 2024-11-18

## 2024-12-24 ENCOUNTER — APPOINTMENT (OUTPATIENT)
Dept: GASTROENTEROLOGY | Facility: CLINIC | Age: 44
End: 2024-12-24

## 2025-01-02 ENCOUNTER — APPOINTMENT (OUTPATIENT)
Dept: GASTROENTEROLOGY | Facility: CLINIC | Age: 45
End: 2025-01-02

## 2025-04-15 ENCOUNTER — NON-APPOINTMENT (OUTPATIENT)
Age: 45
End: 2025-04-15

## 2025-04-15 ENCOUNTER — APPOINTMENT (OUTPATIENT)
Dept: OBGYN | Facility: CLINIC | Age: 45
End: 2025-04-15
Payer: COMMERCIAL

## 2025-04-15 VITALS
DIASTOLIC BLOOD PRESSURE: 68 MMHG | WEIGHT: 110 LBS | BODY MASS INDEX: 19.49 KG/M2 | SYSTOLIC BLOOD PRESSURE: 102 MMHG | HEIGHT: 63 IN

## 2025-04-15 DIAGNOSIS — Z87.42 PERSONAL HISTORY OF OTHER DISEASES OF THE FEMALE GENITAL TRACT: ICD-10-CM

## 2025-04-15 DIAGNOSIS — Z01.419 ENCOUNTER FOR GYNECOLOGICAL EXAMINATION (GENERAL) (ROUTINE) W/OUT ABNORMAL FINDINGS: ICD-10-CM

## 2025-04-15 DIAGNOSIS — Z91.89 OTHER SPECIFIED PERSONAL RISK FACTORS, NOT ELSEWHERE CLASSIFIED: ICD-10-CM

## 2025-04-15 DIAGNOSIS — Z78.9 OTHER SPECIFIED HEALTH STATUS: ICD-10-CM

## 2025-04-15 PROCEDURE — 99459 PELVIC EXAMINATION: CPT

## 2025-04-15 PROCEDURE — 99396 PREV VISIT EST AGE 40-64: CPT

## 2025-06-02 ENCOUNTER — RESULT REVIEW (OUTPATIENT)
Age: 45
End: 2025-06-02

## 2025-06-02 ENCOUNTER — NON-APPOINTMENT (OUTPATIENT)
Age: 45
End: 2025-06-02